# Patient Record
Sex: FEMALE | Race: WHITE
[De-identification: names, ages, dates, MRNs, and addresses within clinical notes are randomized per-mention and may not be internally consistent; named-entity substitution may affect disease eponyms.]

---

## 2018-02-04 ENCOUNTER — HOSPITAL ENCOUNTER (EMERGENCY)
Dept: HOSPITAL 72 - EMR | Age: 38
Discharge: HOME | End: 2018-02-04
Payer: MEDICAID

## 2018-02-04 VITALS — SYSTOLIC BLOOD PRESSURE: 151 MMHG | DIASTOLIC BLOOD PRESSURE: 87 MMHG

## 2018-02-04 VITALS — HEIGHT: 65 IN | BODY MASS INDEX: 22.82 KG/M2 | WEIGHT: 137 LBS

## 2018-02-04 VITALS — DIASTOLIC BLOOD PRESSURE: 87 MMHG | SYSTOLIC BLOOD PRESSURE: 151 MMHG

## 2018-02-04 DIAGNOSIS — J06.9: Primary | ICD-10-CM

## 2018-02-04 DIAGNOSIS — E11.9: ICD-10-CM

## 2018-02-04 PROCEDURE — 99283 EMERGENCY DEPT VISIT LOW MDM: CPT

## 2018-02-04 NOTE — EMERGENCY ROOM REPORT
History of Present Illness


General


Chief Complaint:  Upper Respiratory Illness


Source:  Patient





Present Illness


HPI


Is a 38-year-old female with history of diabetes.  She presents with chief 

complaint of cough and congestion for last for 5 days.  The last 3 days she 

been complaining of unable to sleep because of the coughing and tightness in 

her chest.  Coughing is productive of phlegm.  Subjective fever chills been no 

nausea or vomiting.  No diarrhea.  Does have congestion and specks of blood 

when she blows her nose.


Allergies:  


Coded Allergies:  


     No Known Allergies (Unverified , 2/4/18)





Patient History


Past Medical History:  see triage record, old chart reviewed, DM


Past Surgical History:  none


Pertinent Family History:  none


Social History:  Denies: smoking


Pregnant Now:  No


Immunizations:  other


Reviewed Nursing Documentation:  PMH: Agreed, PSxH: Agreed





Nursing Documentation-PMH


Hx Diabetes:  Yes





Review of Systems


Eye:  Denies: eye pain, blurred vision


ENT:  Reports: nose congestion, Denies: ear pain, throat swelling


Respiratory:  Reports: cough, shortness of breath


Cardiovascular:  Denies: chest pain, palpitations


Gastrointestinal:  Denies: abdominal pain, diarrhea, nausea, vomiting


Musculoskeletal:  Denies: back pain, joint pain


Skin:  Denies: rash


Neurological:  Denies: headache, numbness


Endocrine:  Denies: increased thirst, increased urine


Hematologic/Lymphatic:  Denies: easy bruising


All Other Systems:  negative except mentioned in HPI





Physical Exam





Vital Signs








  Date Time  Temp Pulse Resp B/P (MAP) Pulse Ox O2 Delivery O2 Flow Rate FiO2


 


2/4/18 02:21 98.1 81 18 151/87 97 Room Air  





vitals and high blood pressure


Sp02 EP Interpretation:  reviewed, normal


General Appearance:  well appearing, no apparent distress, alert


Head:  normocephalic, atraumatic


Eyes:  bilateral eye PERRL, bilateral eye EOMI


ENT:  hearing grossly normal, normal pharynx


Neck:  full range of motion, supple, no meningismus


Respiratory:  chest non-tender, lungs clear, normal breath sounds, other - 

Coughing with  inspiration


Cardiovascular #1:  regular rate, rhythm, no murmur


Gastrointestinal:  normal bowel sounds, non tender, no mass, no organomegaly, 

no bruit, non-distended


Musculoskeletal:  back normal, gait/station normal, normal range of motion


Psychiatric:  mood/affect normal


Skin:  warm/dry





Medical Decision Making


Diagnostic Impression:  


 Primary Impression:  


 Upper respiratory infection


 Qualified Codes:  J06.9 - Acute upper respiratory infection, unspecified


ER Course


Present with a viral upper respiratory infection.  No evidence of pneumonia.  

No evidence of sepsis or respiratory distress.  We'll discharge home.





Last Vital Signs








  Date Time  Temp Pulse Resp B/P (MAP) Pulse Ox O2 Delivery O2 Flow Rate FiO2


 


2/4/18 02:21 98.1 81 18 151/87 97 Room Air  








Status:  improved


Disposition:  HOME, SELF-CARE


Condition:  Stable


Scripts


Guaifenesin/Codeine Phos* (ROBITUSSIN AC*) 118 Ml Liquid


5 ML ORAL Q6H Y for For Cough, #118 ML 0 Refills


   Prov: TAHIR TURNER M.D.         2/4/18 


Albuterol Sulfate* (ALBUTEROL SULFATE MDI*) 8.5 Gm Hfa.aer.ad


2 PUFF INH Q4H Y for cough/wheezing, #1 EA 0 Refills


   Prov: TAHIR TURNER M.D.         2/4/18


Referrals:  


NON PHYSICIAN (PCP)


Patient Instructions:  Upper Respiratory Infection, Adult





Additional Instructions:  


Followup with your Dr. in 7 days.  Return if symptom worsen.











TAHIR TURNER M.D. Feb 4, 2018 02:48

## 2018-02-07 ENCOUNTER — HOSPITAL ENCOUNTER (EMERGENCY)
Dept: HOSPITAL 72 - EMR | Age: 38
Discharge: HOME | End: 2018-02-07
Payer: MEDICAID

## 2018-02-07 VITALS — SYSTOLIC BLOOD PRESSURE: 162 MMHG | DIASTOLIC BLOOD PRESSURE: 92 MMHG

## 2018-02-07 VITALS — BODY MASS INDEX: 25.21 KG/M2 | HEIGHT: 62 IN | WEIGHT: 137 LBS

## 2018-02-07 VITALS — SYSTOLIC BLOOD PRESSURE: 155 MMHG | DIASTOLIC BLOOD PRESSURE: 80 MMHG

## 2018-02-07 VITALS — SYSTOLIC BLOOD PRESSURE: 155 MMHG | DIASTOLIC BLOOD PRESSURE: 81 MMHG
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
## 2018-02-07 DIAGNOSIS — E11.9: ICD-10-CM

## 2018-02-07 DIAGNOSIS — S05.01XA: ICD-10-CM

## 2018-02-07 DIAGNOSIS — H10.9: Primary | ICD-10-CM
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
## 2018-02-07 DIAGNOSIS — R51: ICD-10-CM

## 2018-02-07 DIAGNOSIS — S05.02XA: ICD-10-CM

## 2018-02-07 PROCEDURE — 99284 EMERGENCY DEPT VISIT MOD MDM: CPT

## 2018-02-07 NOTE — EMERGENCY ROOM REPORT
History of Present Illness


General


Chief Complaint:  Eye Problems


Source:  Patient





Present Illness


HPI


38-year-old female presents to the emergency department complaining of eye pain 

x6 days.  Patient reports initially having moderate bilateral itchy eyes for 

which has now progressed to burning sensation, discharge, photophobia and an 8/

10 in severity headache around the eyes. Denies sudden onset of HA reports 

progressive.  Patient reports history of diabetes. Denies Loss of vision or 

changes to vision, Floaters, Flashing lights, Diplopia/blurry vision. Pt. 

denies pain with eye movements. pt. states she took Advil yesterday which 

helped her headache. pt. reports constant HA which is located frontally and 

exacerbated with bright lights, also exacerbating burning of the eyes. pt. 

denies neck stiffness/pain. Fevers, chills or known fb to the eyes.  Denies 

frequency, urgency or dysuria.


Allergies:  


Coded Allergies:  


     No Known Allergies (Unverified , 2/4/18)





Patient History


Past Medical History:  see triage record


Past Surgical History:  none


Pertinent Family History:  none


Last Menstrual Period:  on her period


Pregnant Now:  No


Reviewed Nursing Documentation:  PMH: Agreed, PSxH: Agreed





Nursing Documentation-PMH


Hx Diabetes:  Yes





Review of Systems


All Other Systems:  negative except mentioned in HPI





Physical Exam





Vital Signs








  Date Time  Temp Pulse Resp B/P (MAP) Pulse Ox O2 Delivery O2 Flow Rate FiO2


 


2/7/18 19:44 98.1 82 18 162/92 98 Room Air  








Sp02 EP Interpretation:  reviewed, normal


General Appearance:  no apparent distress, alert, GCS 15, non-toxic


Head:  normocephalic, atraumatic


Eyes:  bilateral eye normal inspection, bilateral eye PERRL, bilateral eye EOMI

, bilateral eye photophobia, bilateral eye visual acuity - Bilat: 20/20, L:20/30

, and right 20/50., bilateral eye other - bilateral erythema, some medial mucus 

noted bilaterally. pupils are active and not in a mid-fixed position.  Patient 

had positive pain relief with tetracaine in addition to multiple vertical 

scratches to the bilateral corneas with increased fluorescein uptake


ENT:  hearing grossly normal, normal voice


Neck:  full range of motion


Respiratory:  lungs clear, normal breath sounds, speaking full sentences


Cardiovascular #1:  regular rate, rhythm


Genitourinary:  normal inspection


Musculoskeletal:  back normal, gait/station normal, normal range of motion, non-

tender


Neurologic:  alert, oriented x3, responsive, motor strength/tone normal, 

sensory intact, normal gait, speech normal, no pronator, grossly normal


Psychiatric:  judgement/insight normal


Skin:  normal color, no rash, warm/dry, well hydrated





Medical Decision Making


PA Attestation


Dr. Bryan  is my supervising Physician whom patient management has been 

discussed with.


Diagnostic Impression:  


 Primary Impression:  


 Conjunctivitis


 Qualified Codes:  H10.33 - Unspecified acute conjunctivitis, bilateral


 Additional Impressions:  


 Headache around the eyes


 Corneal abrasion of both eyes


 Qualified Codes:  S05.01XA - Injury of conjunctiva and corneal abrasion 

without foreign body, right eye, initial encounter; S05.02XA - Injury of 

conjunctiva and corneal abrasion without foreign body, left eye, initial 

encounter


ER Course


38-year-old female presents to the emergency department complaining of eye pain 

x6 days.  Patient reports initially having moderate bilateral itchy eyes for 

which has now progressed to burning sensation, discharge, photophobia and an 8/

10 in severity headache around the eyes.  Patient reports history of diabetes. 

Denies Loss of vision or changes to vision, Floaters, Flashing lights, Diplopia/

blurry vision. Pt. denies pain with eye movements. pt. states she took Advil 

yesterday which helped her headache. pt. reports constant HA which is located 

frontally and exacerbated with bright lights, also exacerbating burning of the 

eyes. pt. denies neck stiffness/pain. Fevers, chills or known fb to the eyes. 





-Denies contact lens use. 


 


Ddx considered but are not limited to: corneal abrasion, acute glaucoma, globe 

rupture, FB, Corneal Ulcer, conjunctivitis. Iridis, orbital cellulitis,keratitis

, sinusitis, HA,  UTI, migraine, intracranial hemorrhage/aneurysm  just to name 

a few. 





Vital signs: are WNL, pt. is afebrile 





H&PE are most consistent with: bacterial conjunctivitis, and  associated 

frontal headache around eyes. 





-pupils are active and not in a mid-fixed position.  Patient had positive pain 

relief with tetracaine in addition to multiple vertical scratches to the 

bilateral corneas with increased fluorescein uptake.- Suspect initial allergic/

itching eyes that sustained abrasion from rubbing eyes which then progressed to 

conjunctivitis. 





ORDERS: none at this time. 





ED INTERVENTIONS: 


-Reglan PO


-Excedrin migraine PO





re-examination pt. reports full relief of her pain both HA and eye burning. 





DISCHARGE: At this time pt. is stable for d/c to home. Will provide printed 

patient care instructions, and any necessary prescriptions. Care plan and 

follow up instructions have been discussed with the patient prior to discharge.





Last Vital Signs








  Date Time  Temp Pulse Resp B/P (MAP) Pulse Ox O2 Delivery O2 Flow Rate FiO2


 


2/7/18 19:50 98.1 82 18 162/92 98 Room Air  








Disposition:  HOME, SELF-CARE


Condition:  Stable


Scripts


Aspirin/Acetaminophen/Caffeine (EXCEDRIN MIGRAINE GELTAB) 1 Each Tablet


1 EACH PO Q6HR, #20 TAB


   Prov: Kelly Hdez         2/7/18 


Olopatadine Hcl (PATADAY) 2.5 Ml Drops


1 ML OP DAILY, #2.5 ML


   Prov: Kelly Hdez         2/7/18 


Ofloxacin (OCUFLOX) 5 Ml Drops


2 DROP OP TID for 5 Days, #5 ML


   Prov: Kelly Hdez         2/7/18


Patient Instructions:  Bacterial Conjunctivitis, Corneal Abrasion, Easy-to-Read





Additional Instructions:  


Take medications as directed. 





 ** Follow up with a Ophthalmologist in 3 days, even if your symptoms have 

resolved. ** 





--Please review list of primary care clinics, if you do not already have a 

primary care provider





Return sooner to ED if new symptoms occur, or current symptoms become worse. 








- Please note that this Emergency Department Report was dictated using [employer] technology software, occasionally this can lead to 

erroneous entry secondary to interpretation by the dictation equipment.











Kelly Hdez Feb 7, 2018 23:04